# Patient Record
Sex: FEMALE | Race: WHITE
[De-identification: names, ages, dates, MRNs, and addresses within clinical notes are randomized per-mention and may not be internally consistent; named-entity substitution may affect disease eponyms.]

---

## 2018-07-30 ENCOUNTER — HOSPITAL ENCOUNTER (OUTPATIENT)
Dept: HOSPITAL 45 - C.NUCL | Age: 28
Discharge: HOME | End: 2018-07-30
Attending: SPECIALIST
Payer: COMMERCIAL

## 2018-07-30 DIAGNOSIS — K21.9: Primary | ICD-10-CM

## 2018-07-30 DIAGNOSIS — Z88.2: ICD-10-CM

## 2018-07-30 DIAGNOSIS — Z88.8: ICD-10-CM

## 2018-07-30 NOTE — DIAGNOSTIC IMAGING REPORT
HEPATOBILIARY EF IMAGING



CLINICAL HISTORY: 28 years-old Female with GASTRIC DISCOMFORT RADIATING INTO RT

SHOULDER.  Acute epigastric abdominal pain



TECHNIQUE:  Following the intravenous administration of 5.2 mCi of

technetium-99m Choletec, sequential abdominal images were obtained.  In order to

evaluate the contractile response of the gallbladder, 1.62 mcg of Kinevac was

administered by slow intravenous infusion over 30 min starting approximately 60

min after the administration of the radiopharmaceutical.  Sequential imaging was

continued for 45 min after the start of the Kinevac infusion.



COMPARISON:  None available



FINDINGS:  



There is prompt, uniform accumulation of the tracer by the liver.  There is

normal filling of the intrahepatic ducts, common bile duct and gallbladder and

normal excretion of the tracer into the duodenum.  There is adequate contraction

of the gallbladder.  The calculated gallbladder ejection fraction is 100%

(normal >40%).  There is no significant enterogastric reflux.



IMPRESSION:  



1.  Normal contractile response of the gallbladder to Kinevac infusion.

2.  Normal biliary imaging study.







The above report was generated using voice recognition software. It may contain

grammatical, syntax or spelling errors.







Electronically signed by:  Kurt Carballo M.D.

7/30/2018 10:05 AM



Dictated Date/Time:  7/30/2018 10:03 AM